# Patient Record
Sex: MALE | Race: BLACK OR AFRICAN AMERICAN | NOT HISPANIC OR LATINO | URBAN - NONMETROPOLITAN AREA
[De-identification: names, ages, dates, MRNs, and addresses within clinical notes are randomized per-mention and may not be internally consistent; named-entity substitution may affect disease eponyms.]

---
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
## 2018-01-26 VITALS
WEIGHT: 249.8 LBS | HEIGHT: 70 IN | SYSTOLIC BLOOD PRESSURE: 120 MMHG | DIASTOLIC BLOOD PRESSURE: 82 MMHG | HEART RATE: 64 BPM | BODY MASS INDEX: 35.76 KG/M2

## 2018-03-05 ENCOUNTER — DOCUMENTATION ONLY (OUTPATIENT)
Dept: FAMILY MEDICINE | Facility: OTHER | Age: 18
End: 2018-03-05

## 2018-03-15 ENCOUNTER — OFFICE VISIT (OUTPATIENT)
Dept: FAMILY MEDICINE | Facility: OTHER | Age: 18
End: 2018-03-15
Attending: FAMILY MEDICINE
Payer: COMMERCIAL

## 2018-03-15 VITALS
DIASTOLIC BLOOD PRESSURE: 76 MMHG | HEART RATE: 76 BPM | SYSTOLIC BLOOD PRESSURE: 132 MMHG | WEIGHT: 258.2 LBS | BODY MASS INDEX: 36.97 KG/M2 | HEIGHT: 70 IN

## 2018-03-15 DIAGNOSIS — M25.562 ACUTE PAIN OF LEFT KNEE: Primary | ICD-10-CM

## 2018-03-15 PROCEDURE — 99213 OFFICE O/P EST LOW 20 MIN: CPT | Performed by: FAMILY MEDICINE

## 2018-03-15 RX ORDER — ETODOLAC 500 MG
500 TABLET ORAL 2 TIMES DAILY
Qty: 14 TABLET | Refills: 1 | Status: SHIPPED | OUTPATIENT
Start: 2018-03-15 | End: 2018-08-30

## 2018-03-15 ASSESSMENT — PAIN SCALES - GENERAL: PAINLEVEL: MILD PAIN (2)

## 2018-03-15 NOTE — MR AVS SNAPSHOT
"              After Visit Summary   3/15/2018    Jose Barksdale    MRN: [id number]           Patient Information     Date Of Birth          2000        Visit Information        Provider Department      3/15/2018 4:15 PM Lamont Rae MD [facility]        Today's Diagnoses     Acute pain of left knee    -  1       Follow-ups after your visit        Who to contact     If you have questions or need follow up information about today's clinic visit or your schedule please contact [facility] directly at [phone].  Normal or non-critical lab and imaging results will be communicated to you by Nyxoahhart, letter or phone within 4 business days after the clinic has received the results. If you do not hear from us within 7 days, please contact the clinic through fitkitt or phone. If you have a critical or abnormal lab result, we will notify you by phone as soon as possible.  Submit refill requests through [employer] or call your pharmacy and they will forward the refill request to us. Please allow 3 business days for your refill to be completed.          Additional Information About Your Visit        MyChart Information     [employer] lets you send messages to your doctor, view your test results, renew your prescriptions, schedule appointments and more. To sign up, go to [email or web address] . Click on \"Log in\" on the left side of the screen, which will take you to the Welcome page. Then click on \"Sign up Now\" on the right side of the page.     You will be asked to enter the access code listed below, as well as some personal information. Please follow the directions to create your username and password.     Your access code is: [email or web address]  Expires: 2018  8:13 AM     Your access code will  in 90 days. If you need help or a new code, please call your [facility] or [phone].        Care EveryWhere ID     This is your Care EveryWhere ID. This could be used by " "other organizations to access your [place] medical records  [id number]        Your Vitals Were     Pulse Height BMI (Body Mass Index)             76 5' 10\" (1.778 m) 37.05 kg/m2          Blood Pressure from Last 3 Encounters:   03/15/18 132/76   08/18/17 120/82    Weight from Last 3 Encounters:   03/15/18 258 lb 3.2 oz (117.1 kg) (>99 %)*   08/18/17 249 lb 12.8 oz (113.3 kg) (>99 %)*     * Growth percentiles are based on [facility] 2-20 Years data.              Today, you had the following     No orders found for display         Today's Medication Changes          These changes are accurate as of 3/15/18 11:59 PM.  If you have any questions, ask your nurse or doctor.               Start taking these medicines.        Dose/Directions    etodolac 500 MG tablet   Commonly known as:  LODINE   Used for:  Acute pain of left knee   Started by:  Lamont Rae MD        Dose:  500 mg   Take 1 tablet (500 mg) by mouth 2 times daily   Quantity:  14 tablet   Refills:  1            Where to get your medicines      These medications were sent to [id number] - [place], [place] - [address]  [address], [facility] [address]     Phone:  [phone]     etodolac 500 MG tablet                Primary Care Provider Fax #    Physician No Ref-Primary [phone]       No address on file        Equal Access to Services     CARLY MARTINEZ AH: Hadii bunny mayero Sodgali, waaxda luqadaha, qaybta kaalmada adeegyada, reed sahu . So [facility] [phone].    ATENCIÓN: Si habla español, tiene a mcdowell disposición servicios gratuitos de asistencia lingüística. Drea al [phone].    We comply with applicable federal civil rights laws and [place] laws. We do not discriminate on the basis of race, color, national origin, age, disability, sex, sexual orientation, or gender identity.            Thank you!     Thank you for choosing [facility] AND [facility]  for " your care. Our goal is always to provide you with excellent care. Hearing back from our patients is one way we can continue to improve our services. Please take a few minutes to complete the written survey that you may receive in the mail after your visit with us. Thank you!             Your Updated Medication List - Protect others around you: Learn how to safely use, store and throw away your medicines at www.disposemymeds.org.          This list is accurate as of 3/15/18 11:59 PM.  Always use your most recent med list.                   Brand Name Dispense Instructions for use Diagnosis    etodolac 500 MG tablet    LODINE    14 tablet    Take 1 tablet (500 mg) by mouth 2 times daily    Acute pain of left knee

## 2018-03-15 NOTE — NURSING NOTE
Patient presents today for left knee pain that started week ago, and right hip flexor pain going on 2 months. Patient plays football, and works out daily.    Yessenia Pradhan LPN on 3/15/2018 at 4:18 PM

## 2018-03-16 NOTE — PROGRESS NOTES
"  SUBJECTIVE:   Jose Barksdale is a 17 year old male who presents to clinic today for the following health issues: Left knee right hip pain    HPI Comments: Patient arrives here for left knee pain and right hip pain.  Especially with movement.  Started about 2 days ago.  He woke up with it with his left knee stiff.  Mainly located on the left lateral and medial side.  Also reports right hip discomfort.  He denies any injury but states when he squats it hurts.  Extends up into the groin area.Patient does play football for [employer] and was recently started increasing his exercise.    Knee Pain           Patient Active Problem List    Diagnosis Date Noted     Acute pain of left knee 03/15/2018     Priority: Medium     Past Medical History:   Diagnosis Date     Personal history of other medical treatment (CODE)     No Comments Provided      Past Surgical History:   Procedure Laterality Date     OTHER SURGICAL HISTORY      [id number],NO PAST SURGERIES     Current Outpatient Prescriptions   Medication Sig Dispense Refill     etodolac (LODINE) 500 MG tablet Take 1 tablet (500 mg) by mouth 2 times daily 14 tablet 1     No Known Allergies    Review of Systems     OBJECTIVE:     /76 (BP Location: Right arm, Patient Position: Sitting, Cuff Size: Adult Large)  Pulse 76  Ht 5' 10\" (1.778 m)  Wt 258 lb 3.2 oz (117.1 kg)  BMI 37.05 kg/m2  Body mass index is 37.05 kg/(m^2).  Physical Exam   Constitutional: He appears well-developed.   Genitourinary:   Genitourinary Comments: No inguinal hernia present.   Musculoskeletal:   Internal/external rotation of the hip is unremarkable.  His left knee ligaments are intact.  There is no joint effusion.   Neurological: He is alert.   Skin: Skin is warm.       none     ASSESSMENT/PLAN:       We will start Lodine.    ICD-10-CM    1. Acute pain of left knee M25.562 etodolac (LODINE) 500 MG tablet     I suspect his right hip is muscular in origin.  Is pointing to the groin where it hurts.  Refrain " from activity.  If symptoms do not resolve after refraining follow-up in clinic.    Lamont Rae MD  [facility] AND [facility]

## 2018-04-05 ENCOUNTER — OFFICE VISIT (OUTPATIENT)
Dept: FAMILY MEDICINE | Facility: OTHER | Age: 18
End: 2018-04-05
Attending: FAMILY MEDICINE
Payer: COMMERCIAL

## 2018-04-05 VITALS
DIASTOLIC BLOOD PRESSURE: 80 MMHG | SYSTOLIC BLOOD PRESSURE: 124 MMHG | HEART RATE: 60 BPM | HEIGHT: 70 IN | BODY MASS INDEX: 37.16 KG/M2 | WEIGHT: 259.6 LBS

## 2018-04-05 DIAGNOSIS — M79.661 PAIN OF RIGHT LOWER LEG: Primary | ICD-10-CM

## 2018-04-05 PROCEDURE — 99213 OFFICE O/P EST LOW 20 MIN: CPT | Performed by: FAMILY MEDICINE

## 2018-04-05 PROCEDURE — G0463 HOSPITAL OUTPT CLINIC VISIT: HCPCS

## 2018-04-05 ASSESSMENT — PAIN SCALES - GENERAL: PAINLEVEL: SEVERE PAIN (7)

## 2018-04-05 NOTE — MR AVS SNAPSHOT
"              After Visit Summary   2018    Jose Barksdale    MRN: [id number]           Patient Information     Date Of Birth          2000        Visit Information        Provider Department      2018 3:00 PM Lamont Rae MD [facility]        Today's Diagnoses     Pain of right lower leg    -  1       Follow-ups after your visit        Additional Services     PHYSICAL THERAPY REFERRAL       *                  Who to contact     If you have questions or need follow up information about today's clinic visit or your schedule please contact [facility] directly at [phone].  Normal or non-critical lab and imaging results will be communicated to you by SUNDAYTOZhart, letter or phone within 4 business days after the clinic has received the results. If you do not hear from us within 7 days, please contact the clinic through [employer] or phone. If you have a critical or abnormal lab result, we will notify you by phone as soon as possible.  Submit refill requests through [employer] or call your pharmacy and they will forward the refill request to us. Please allow 3 business days for your refill to be completed.          Additional Information About Your Visit        MyChart Information     [employer] lets you send messages to your doctor, view your test results, renew your prescriptions, schedule appointments and more. To sign up, go to [email or web address] . Click on \"Log in\" on the left side of the screen, which will take you to the Welcome page. Then click on \"Sign up Now\" on the right side of the page.     You will be asked to enter the access code listed below, as well as some personal information. Please follow the directions to create your username and password.     Your access code is: [email or web address]  Expires: 2018  8:13 AM     Your access code will  in 90 days. If you need help or a new code, please call your [place] clinic or [phone].        Care " "EveryWhere ID     This is your Care EveryWhere ID. This could be used by other organizations to access your [place] medical records  [id number]        Your Vitals Were     Pulse Height BMI (Body Mass Index)             60 5' 10\" (1.778 m) 37.25 kg/m2          Blood Pressure from Last 3 Encounters:   04/05/18 124/80   03/15/18 132/76   08/18/17 120/82    Weight from Last 3 Encounters:   04/05/18 259 lb 9.6 oz (117.8 kg) (>99 %)*   03/15/18 258 lb 3.2 oz (117.1 kg) (>99 %)*   08/18/17 249 lb 12.8 oz (113.3 kg) (>99 %)*     * Growth percentiles are based on [facility] 2-20 Years data.              We Performed the Following     PHYSICAL THERAPY REFERRAL        Primary Care Provider Fax #    Physician No Ref-Primary [phone]       No address on file        Equal Access to Services     CARLY MARTINEZ : Hadii bunny Douglas, waelke bae, qacheryl kaalmada louisa, reed sahu . So [facility] [phone].    ATENCIÓN: Si habla español, tiene a mcdowell disposición servicios gratuitos de asistencia lingüística. Llame al [phone].    We comply with applicable federal civil rights laws and [place] laws. We do not discriminate on the basis of race, color, national origin, age, disability, sex, sexual orientation, or gender identity.            Thank you!     Thank you for choosing [facility] AND [facility]  for your care. Our goal is always to provide you with excellent care. Hearing back from our patients is one way we can continue to improve our services. Please take a few minutes to complete the written survey that you may receive in the mail after your visit with us. Thank you!             Your Updated Medication List - Protect others around you: Learn how to safely use, store and throw away your medicines at www.disposemymeds.org.          This list is accurate as of 4/5/18 11:59 PM.  Always use your most recent med list.                   Brand Name Dispense Instructions for use " Diagnosis    etodolac 500 MG tablet    LODINE    14 tablet    Take 1 tablet (500 mg) by mouth 2 times daily    Acute pain of left knee

## 2018-04-05 NOTE — NURSING NOTE
Patient here for right knee pain and right hip pain. Tried the Lodine but still hurts. He is in football now.Waleska Yi LPN .......................4/5/2018  3:07 PM

## 2018-04-06 PROBLEM — M79.661 PAIN OF RIGHT LOWER LEG: Status: ACTIVE | Noted: 2018-04-06

## 2018-04-06 PROBLEM — M25.562 ACUTE PAIN OF LEFT KNEE: Status: RESOLVED | Noted: 2018-03-15 | Resolved: 2018-04-06

## 2018-04-06 NOTE — PROGRESS NOTES
"  SUBJECTIVE:   Jose Barksdale is a 17 year old male who presents to clinic today for the following health issues: Follow-up to leg pain right buttocks pain    HPI Comments: Patient arrives here for follow-up to right leg pain.  Extending into the hip.  It has been going on 3 weeks.  He was seen a couple weeks ago and started on Lodine without any improvement.  He continues to lift with his upper body.  But states he has not really been exercising his lower body.  He is interested in physical therapy.  Also is a little concerned about knee pain    Pain           Patient Active Problem List    Diagnosis Date Noted     Pain of right lower leg 04/06/2018     Priority: Medium     Past Medical History:   Diagnosis Date     Personal history of other medical treatment (CODE)     No Comments Provided      Past Surgical History:   Procedure Laterality Date     OTHER SURGICAL HISTORY      [id number],NO PAST SURGERIES     Current Outpatient Prescriptions   Medication Sig Dispense Refill     etodolac (LODINE) 500 MG tablet Take 1 tablet (500 mg) by mouth 2 times daily (Patient not taking: Reported on 4/5/2018) 14 tablet 1     No Known Allergies    Review of Systems     OBJECTIVE:     /80 (BP Location: Right arm, Patient Position: Sitting)  Pulse 60  Ht 5' 10\" (1.778 m)  Wt 259 lb 9.6 oz (117.8 kg)  BMI 37.25 kg/m2  Body mass index is 37.25 kg/(m^2).  Physical Exam   Constitutional: He appears well-developed.   Musculoskeletal: Normal range of motion. He exhibits no tenderness.   Knee does not show any joint effusion.  Ligaments are intact period leg shows good range of motion.  Internal/external rotation the hip was normal.  Quite muscular   Neurological: He is alert.       none     ASSESSMENT/PLAN:       I discussed stretching.  Patient states he stretches on a regular basis.  Have patient see physical therapy.  Patient will call if he does not get any appointment in the next week or 2.    ICD-10-CM    1. Pain of right " lower leg M79.661 PHYSICAL THERAPY REFERRAL         Lamont Rae MD  [facility] AND [facility]

## 2018-05-02 ENCOUNTER — HOSPITAL ENCOUNTER (OUTPATIENT)
Dept: GENERAL RADIOLOGY | Facility: OTHER | Age: 18
End: 2018-05-02
Attending: FAMILY MEDICINE
Payer: COMMERCIAL

## 2018-05-02 ENCOUNTER — OFFICE VISIT (OUTPATIENT)
Dept: FAMILY MEDICINE | Facility: OTHER | Age: 18
End: 2018-05-02
Attending: FAMILY MEDICINE
Payer: COMMERCIAL

## 2018-05-02 ENCOUNTER — HOSPITAL ENCOUNTER (OUTPATIENT)
Dept: GENERAL RADIOLOGY | Facility: OTHER | Age: 18
Discharge: HOME | End: 2018-05-02
Attending: FAMILY MEDICINE | Admitting: FAMILY MEDICINE
Payer: COMMERCIAL

## 2018-05-02 VITALS
SYSTOLIC BLOOD PRESSURE: 130 MMHG | HEART RATE: 64 BPM | WEIGHT: 260.2 LBS | DIASTOLIC BLOOD PRESSURE: 64 MMHG | BODY MASS INDEX: 37.33 KG/M2

## 2018-05-02 DIAGNOSIS — G89.29 CHRONIC PAIN OF RIGHT KNEE: Primary | ICD-10-CM

## 2018-05-02 DIAGNOSIS — M25.562 CHRONIC PAIN OF LEFT KNEE: ICD-10-CM

## 2018-05-02 DIAGNOSIS — M25.551 HIP PAIN, RIGHT: ICD-10-CM

## 2018-05-02 DIAGNOSIS — G89.29 CHRONIC PAIN OF LEFT KNEE: ICD-10-CM

## 2018-05-02 DIAGNOSIS — M25.561 CHRONIC PAIN OF RIGHT KNEE: Primary | ICD-10-CM

## 2018-05-02 PROBLEM — M79.661 PAIN OF RIGHT LOWER LEG: Status: RESOLVED | Noted: 2018-04-06 | Resolved: 2018-05-02

## 2018-05-02 PROCEDURE — 73502 X-RAY EXAM HIP UNI 2-3 VIEWS: CPT

## 2018-05-02 PROCEDURE — 99213 OFFICE O/P EST LOW 20 MIN: CPT | Performed by: FAMILY MEDICINE

## 2018-05-02 PROCEDURE — G0463 HOSPITAL OUTPT CLINIC VISIT: HCPCS

## 2018-05-02 PROCEDURE — 73564 X-RAY EXAM KNEE 4 OR MORE: CPT | Mod: 50

## 2018-05-02 PROCEDURE — G0463 HOSPITAL OUTPT CLINIC VISIT: HCPCS | Mod: 25

## 2018-05-02 ASSESSMENT — PAIN SCALES - GENERAL: PAINLEVEL: NO PAIN (0)

## 2018-05-02 NOTE — NURSING NOTE
Patient here for follow up to bilateral knee pain and his hips do not feel right. Right hip pain. He is not taking any medications. Waleska Yi LPN .......................5/2/2018  3:49 PM

## 2018-05-03 NOTE — PROGRESS NOTES
SUBJECTIVE:   Jose Barksdale is a 17 year old male who presents to clinic today for the following health issues: Bilateral knee pain right hip pain    HPI Comments: Patient arrives here for bilateral knee pain and right hip pain.  He reports pain is worse with activity.  Requesting x-rays.  No activity produces no pain.  He has been seen on 2 occasions in the past given anti-inflammatory medications recommendations for stretching and exercises.  He reports no improvement    Knee Pain           Patient Active Problem List    Diagnosis Date Noted     Chronic pain of right knee 05/02/2018     Priority: Medium     Chronic pain of left knee 05/02/2018     Priority: Medium     Hip pain, right 05/02/2018     Priority: Medium     Past Medical History:   Diagnosis Date     Personal history of other medical treatment (CODE)     No Comments Provided      Past Surgical History:   Procedure Laterality Date     OTHER SURGICAL HISTORY      [id number],NO PAST SURGERIES     No Known Allergies    Review of Systems     OBJECTIVE:     /64 (Patient Position: Sitting)  Pulse 64  Wt 260 lb 3.2 oz (118 kg)  BMI 37.33 kg/m2  Body mass index is 37.33 kg/(m^2).  Physical Exam   Constitutional: He appears well-developed.   Neurological: He is alert.   Psychiatric: He has a normal mood and affect.       Diagnostic Test Results:  Results for orders placed or performed in visit on 05/02/18   XR Knee Bilateral G/E 4 Views    Narrative    PROCEDURE:  XR HIP RIGHT 2-3 VIEWS, XR KNEE BILATERAL G/E 4 VW    HISTORY: ; Hip pain, right.    COMPARISON:  None.    TECHNIQUE:  2 views right hip. 5 views of the knees.    FINDINGS:  No fracture or dislocation is identified. No CAM or pincer  abnormality is identified however, a small notch is questioned within  the superior right femoral neck. The joint spaces are preserved. No  foreign body is seen.     The knees appear normal, with preserved joint spaces and no evidence  of suprapatellar effusion.       Impression    IMPRESSION: Question small notch right superior femoral neck.  Correlate for any evidence of impingement.      Negative radiographs of the knees.    MARI JEFFERY MD   XR Hip Right 2-3 Views    Narrative    PROCEDURE:  XR HIP RIGHT 2-3 VIEWS, XR KNEE BILATERAL G/E 4 VW    HISTORY: ; Hip pain, right.    COMPARISON:  None.    TECHNIQUE:  2 views right hip. 5 views of the knees.    FINDINGS:  No fracture or dislocation is identified. No CAM or pincer  abnormality is identified however, a small notch is questioned within  the superior right femoral neck. The joint spaces are preserved. No  foreign body is seen.     The knees appear normal, with preserved joint spaces and no evidence  of suprapatellar effusion.      Impression    IMPRESSION: Question small notch right superior femoral neck.  Correlate for any evidence of impingement.      Negative radiographs of the knees.    MARI JEFFERY MD       ASSESSMENT/PLAN:           ICD-10-CM    1. Chronic pain of right knee M25.561 XR Knee Bilateral G/E 4 Views    G89.29 ORTHOPEDICS ADULT REFERRAL   2. Chronic pain of left knee M25.562 XR Knee Bilateral G/E 4 Views    G89.29 ORTHOPEDICS ADULT REFERRAL   3. Hip pain, right M25.551 XR Hip Right 2-3 Views   Patient has questions about doing an MRI.  He also is wanting a second opinion.  Arrangements made with orthopedic surgery.      Lamont Rae MD  [facility]

## 2018-05-07 ENCOUNTER — OFFICE VISIT (OUTPATIENT)
Dept: ORTHOPEDICS | Facility: OTHER | Age: 18
End: 2018-05-07
Attending: ORTHOPAEDIC SURGERY
Payer: COMMERCIAL

## 2018-05-07 VITALS
HEART RATE: 60 BPM | SYSTOLIC BLOOD PRESSURE: 118 MMHG | HEIGHT: 70 IN | BODY MASS INDEX: 37.22 KG/M2 | DIASTOLIC BLOOD PRESSURE: 80 MMHG | WEIGHT: 260 LBS

## 2018-05-07 DIAGNOSIS — R29.898 HIP TIGHTNESS: Primary | ICD-10-CM

## 2018-05-07 DIAGNOSIS — G89.29 CHRONIC PAIN OF RIGHT KNEE: ICD-10-CM

## 2018-05-07 DIAGNOSIS — M25.561 CHRONIC PAIN OF RIGHT KNEE: ICD-10-CM

## 2018-05-07 DIAGNOSIS — M25.562 CHRONIC PAIN OF LEFT KNEE: ICD-10-CM

## 2018-05-07 DIAGNOSIS — G89.29 CHRONIC PAIN OF LEFT KNEE: ICD-10-CM

## 2018-05-07 PROCEDURE — 99203 OFFICE O/P NEW LOW 30 MIN: CPT | Performed by: ORTHOPAEDIC SURGERY

## 2018-05-07 PROCEDURE — G0463 HOSPITAL OUTPT CLINIC VISIT: HCPCS

## 2018-05-07 ASSESSMENT — PAIN SCALES - GENERAL: PAINLEVEL: MILD PAIN (3)

## 2018-05-07 NOTE — PROGRESS NOTES
Jose Barksdale was seen in consultation for Dr. Rae for a chief complaint of right hip pain as well as soreness into his knees.    CHIEF COMPLAINT: Jose Barksdale is a 17 year old  male  Chief Complaint   Patient presents with     Consult     Right hip pain       HISTORY OF PRESENTING INJURY   History of presenting injury, patient has had ongoing complaints of some generalized hip and leg pain since February.  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  Pain is diffuse and globally around the hip but running from buttock towards the quadriceps and hamstrings towards his knees.  Description of pain: no.  Radiation of pain: Yes.  Pain course: stable.  Worse with: Increased activity.  Improved by: Rest.  History of injection: no.  Any PT: no.      PAST MEDICAL HISTORY:  Past Medical History:   Diagnosis Date     Personal history of other medical treatment (CODE)     No Comments Provided       PAST SURGICAL HISTORY:  Past Surgical History:   Procedure Laterality Date     OTHER SURGICAL HISTORY      [id number],NO PAST SURGERIES       ALLERGIES:  No Known Allergies    CURRENT MEDICATIONS:  Current Outpatient Prescriptions   Medication Sig Dispense Refill     etodolac (LODINE) 500 MG tablet Take 1 tablet (500 mg) by mouth 2 times daily 14 tablet 1       SOCIAL HISTORY:  Marital Status: single (never ).  Children: no.  Occupation: Student.  Alcohol use:none.  Tobacco use: Smoker: no.  Are you or have you used illicit drugs:  no.    FAMILY HISTORY:  Family History   Problem Relation Age of Onset     Hypertension Mother      Hypertension     Type 2 Diabetes Father      Diabetes type II       REVIEW OF SYSTEMS:  The review of systems as documented in the HPI and on the intake questionnaire, completed by the patient on 5/7/2018 have been reviewed by myself and the pertinent positives and negatives addressed.  The  "remainder of the complete review of systems was non-contributory.    PHYSICALEXAM:   /80  Pulse 60  Ht 1.778 m (5' 10\")  Wt 117.9 kg (260 lb)  BMI 37.31 kg/m2 Body mass index is 37.31 kg/(m^2).    General Appearance: Pleasant 17 year old male in good appearance, mood and affect.    Alert and orientated times three ( time, date and location).    Skin: Intact.      Hip:  Palpation: Tenderness along the iliotibial band but also into the quadriceps and hamstring region.  Flexion: normal.  Extension: normal.  Internal rotation: normal.  External rotation: normal.  Special maneuvers: negative pain with push-pull, and/or log roll testing  Strength: Equal bilaterally.    Knee:    Effusion: No effusion.  Motion: Slight tightness into the hamstrings.  Patella tracks midline.  Varus stress testing normal.  Valgus stress testing normal.  Anterior drawer test normal.  Posterior drawer test normal.  Lachman's test normal.  Ban's is normal.  Lorain test is normal.  Apley's grind test negative.    Feet:  Pes planus positive.  Sensation  normal.  Capillary refill normal.    Eyes: Pupils are round.    Ears:  Hearing: Intact.    Heart: Good capillary refill into his hands pulses are regular.    Lungs: Clear.     Radiographic images from 5/2 where independently reviewed and discussed with the patient.      Xray:    Small cystic area old in nature right femoral neck.  No signs of impingement or cam lesions.  X-rays of the knee show slight varus malalignment but maintained joint spaces.    PROCEDURE: XR HIP RIGHT 2-3 VIEWS, XR KNEE BILATERAL G/E 4 VW    HISTORY: ; Hip pain, right.    COMPARISON: None.    TECHNIQUE: 2 views right hip. 5 views of the knees.    FINDINGS: No fracture or dislocation is identified. No CAM or pincer  abnormality is identified however, a small notch is questioned within  the superior right femoral neck. The joint spaces are preserved. No  foreign body is seen.     The knees appear normal, with " preserved joint spaces and no evidence  of suprapatellar effusion.    IMPRESSION: Question small notch right superior femoral neck.  Correlate for any evidence of impingement.     Negative radiographs of the knees.    MARI JEFFERY MD    IMPRESSION:    Imbalance of the flexor and extensors of the right hip.    PLAN:  Risks, benefits, conservative, surgical and alternatives to treatment where discussed and the patient would like to proceed with conservative measures.  I will have him see the physical therapist for a good stretching program and to assess hip and knee and see if there is anything else needs to be working on.  Questions and concerns answered to his satisfaction.  He will continue to work on his stretches prior to doing any weight lifting.  No surgical intervention needed can follow-up with his primary care physician.    Luis Milian D.O.  Orthopaedic Surgeon    [facility] and [address] [place] [address]  Phone [phone] (KNEE)  Fax [phone]    Disclaimer:  This note consists of words and symbols derived from keyboarding, dictation, or using voice recognition software. As a result, there may be errors in the script that have gone undetected. Please consider this when interpreting information found in this note.    4:17 PM 5/7/2018

## 2018-05-07 NOTE — MR AVS SNAPSHOT
"              After Visit Summary   5/7/2018    Jose Barksdale    MRN: [id number]           Patient Information     Date Of Birth          2000        Visit Information        Provider Department      5/7/2018 3:45 PM Luis Milian,  [facility]        Today's Diagnoses     Hip tightness    -  1    Chronic pain of left knee        Chronic pain of right knee           Follow-ups after your visit        Additional Services     PHYSICAL THERAPY REFERRAL       To see and assess and set up a good home program for his hip and knee stretches in regards to playing football at [employer]                  Follow-up notes from your care team     Return if symptoms worsen or fail to improve.      Who to contact     If you have questions or need follow up information about today's clinic visit or your schedule please contact [facility] directly at [phone].  Normal or non-critical lab and imaging results will be communicated to you by Internet Mallhart, letter or phone within 4 business days after the clinic has received the results. If you do not hear from us within 7 days, please contact the clinic through Internet Mallhart or phone. If you have a critical or abnormal lab result, we will notify you by phone as soon as possible.  Submit refill requests through Oohly or call your pharmacy and they will forward the refill request to us. Please allow 3 business days for your refill to be completed.          Additional Information About Your Visit        MyChart Information     Oohly lets you send messages to your doctor, view your test results, renew your prescriptions, schedule appointments and more. To sign up, go to [email or web address] . Click on \"Log in\" on the left side of the screen, which will take you to the Welcome page. Then click on \"Sign up Now\" on the right side of the page.     You will be asked to enter the access code listed below, as well as some personal information. Please " "follow the directions to create your username and password.     Your access code is: [email or web address]  Expires: 2018  8:13 AM     Your access code will  in 90 days. If you need help or a new code, please call your [place] clinic or [phone].        Care EveryWhere ID     This is your Care EveryWhere ID. This could be used by other organizations to access your [place] medical records  [id number]        Your Vitals Were     Pulse Height BMI (Body Mass Index)             60 1.778 m (5' 10\") 37.31 kg/m2          Blood Pressure from Last 3 Encounters:   18 118/80   18 130/64   18 124/80    Weight from Last 3 Encounters:   18 117.9 kg (260 lb) (>99 %)*   18 118 kg (260 lb 3.2 oz) (>99 %)*   18 117.8 kg (259 lb 9.6 oz) (>99 %)*     * Growth percentiles are based on [facility] 2-20 Years data.              We Performed the Following     PHYSICAL THERAPY REFERRAL        Primary Care Provider Fax #    Physician No Ref-Primary [phone]       No address on file        Equal Access to Services     CARLY MARTINEZ : Daniel Douglas, nigel bae, qacheryl kaaljuly jasso, reed huffman. So [facility] [phone].    ATENCIÓN: Si habla español, tiene a mcdowell disposición servicios gratuitos de asistencia lingüística. Llame al [phone].    We comply with applicable federal civil rights laws and [place] laws. We do not discriminate on the basis of race, color, national origin, age, disability, sex, sexual orientation, or gender identity.            Thank you!     Thank you for choosing [facility] AND [facility]  for your care. Our goal is always to provide you with excellent care. Hearing back from our patients is one way we can continue to improve our services. Please take a few minutes to complete the written survey that you may receive in the mail after your visit with us. Thank you!             Your Updated Medication List - Protect " others around you: Learn how to safely use, store and throw away your medicines at www.disposemymeds.org.          This list is accurate as of 5/7/18  4:23 PM.  Always use your most recent med list.                   Brand Name Dispense Instructions for use Diagnosis    etodolac 500 MG tablet    LODINE    14 tablet    Take 1 tablet (500 mg) by mouth 2 times daily    Acute pain of left knee

## 2018-05-17 ENCOUNTER — HOSPITAL ENCOUNTER (OUTPATIENT)
Dept: PHYSICAL THERAPY | Facility: OTHER | Age: 18
Setting detail: THERAPIES SERIES
End: 2018-05-17
Attending: ORTHOPAEDIC SURGERY
Payer: COMMERCIAL

## 2018-05-17 PROCEDURE — 40000185 ZZHC STATISTIC PT OUTPT VISIT: Performed by: PHYSICAL THERAPIST

## 2018-05-17 PROCEDURE — 97110 THERAPEUTIC EXERCISES: CPT | Mod: GP | Performed by: PHYSICAL THERAPIST

## 2018-05-17 PROCEDURE — 97140 MANUAL THERAPY 1/> REGIONS: CPT | Mod: GP | Performed by: PHYSICAL THERAPIST

## 2018-05-17 PROCEDURE — 97161 PT EVAL LOW COMPLEX 20 MIN: CPT | Mod: GP | Performed by: PHYSICAL THERAPIST

## 2018-05-17 NOTE — PROGRESS NOTES
05/17/18 1329   General Information   Type of Visit Initial OP Ortho PT Evaluation   Start of Care Date 05/17/18   Referring Physician Maicol   Patient/Family Goals Statement Play football this fall for [employer]   Orders Evaluate and Treat   Date of Order 05/07/18   Insurance Type (commercial)   Medical Diagnosis Hip tightness R29.898   Surgical/Medical history reviewed Yes  (reviewed Dr Milian's note)   Precautions/Limitations no known precautions/limitations   Weight-Bearing Status - LLE full weight-bearing   Weight-Bearing Status - RLE full weight-bearing   Special Instructions set up on home stretching program   General Information Comments please refer to pt's medical record for any additional information   Presentation and Etiology   Pertinent history of current problem (include personal factors and/or comorbidities that impact the POC) In February pt was noticing some him discomfort and tightness, R>L, during his workouts. This would sometimes cause some discomfort in his knees that he noticed mostly with running. His symptoms would come and go. I spoke with him on one occasion in the training room about his symptoms. He went to the dr to confirm that there was nothing significantly wrong. Overall he thinks that he is gradually getting better.    Impairments (pain that he relates to a muscle strain)   Functional Limitations perform desired leisure / sports activities   Symptom Location R hip   How/Where did it occur During recreation/sports   Onset date of current episode/exacerbation 02/17/18   Chronicity Recurrent   Pain rating (0-10 point scale) Best (/10);Worst (/10)   Best (/10) 0   Worst (/10) 8   Pain quality G. Cramping;C. Aching   Frequency of pain/symptoms C. With activity   Pain/symptoms exacerbated by D. Carrying  (running)   Pain/symptoms eased by D. Nothing   Progression of symptoms since onset: Improved   Current / Previous Interventions   Diagnostic Tests: X-ray   X-ray Results (see pt's  chart for details)   Prior Level of Function   Prior Level of Function-Mobility Ind   Prior Level of Function-ADLs Ind   Current Level of Function   Current Community Support Other   Patient role/employment history B. Student  ([employer])   Living environment Apartment/condo   Fall Risk Screen   Fall screen completed by PT   Have you fallen 2 or more times in the past year? No   Have you fallen and had an injury in the past year? No   Is patient a fall risk? No   Functional Scales   Functional Scales Other   Other Scales  PSFS   Planned Therapy Interventions   Planned Therapy Interventions joint mobilization;manual therapy;motor coordination training;neuromuscular re-education;stretching;strengthening;ROM   Planned Modality Interventions   Planned Modality Interventions Cryotherapy;Electrical stimulation;Ultrasound   Clinical Impression   Criteria for Skilled Therapeutic Interventions Met yes, treatment indicated   PT Diagnosis R hip pain   Clinical Presentation Stable/Uncomplicated   Clinical Decision Making (Complexity) Low complexity   Therapy Frequency 1 time/week   Predicted Duration of Therapy Intervention (days/wks) 8 weeks   Risk & Benefits of therapy have been explained Yes   Patient, Family & other staff in agreement with plan of care Yes   Education Assessment   Preferred Learning Style Pictures/video;Demonstration   Barriers to Learning No barriers   ORTHO GOALS   PT Ortho Eval Goals 2;1   Ortho Goal 1   Goal Identifier Pain   Goal Description Pt to report a max pain level of 3/10 for improved working out and other football related activities   Target Date 07/12/18   Ortho Goal 2   Goal Identifier Working out/FB related activities   Goal Description Pt to work out without any increased R hip pain   Target Date 07/12/18   Total Evaluation Time   Total Evaluation Time 20

## 2018-07-18 NOTE — PROGRESS NOTES
Outpatient Physical Therapy Discharge Note     Patient: Jose Barksdale  : 2000    Beginning/End Dates:  18    Referring Provider: Dr Milian    Therapy Diagnosis: Hip tightness R29.898    Plan:  Discharge from therapy.    Discharge:   Pt no-showed or canceled their most recent PT appointments and did not rescheduled. Please refer to pt's chart for any additional information. This is an unplanned DC    Reason for Discharge: Patient has failed to schedule further appointments.    Discharge Plan: Patient to continue home program.

## 2018-07-18 NOTE — ADDENDUM NOTE
Encounter addended by: Samson Lewis, PT on: 7/18/2018  7:57 AM<BR>     Actions taken: Sign clinical note, Episode resolved

## 2018-08-30 ENCOUNTER — OFFICE VISIT (OUTPATIENT)
Dept: FAMILY MEDICINE | Facility: OTHER | Age: 18
End: 2018-08-30
Attending: NURSE PRACTITIONER
Payer: COMMERCIAL

## 2018-08-30 VITALS
WEIGHT: 259.8 LBS | BODY MASS INDEX: 37.28 KG/M2 | SYSTOLIC BLOOD PRESSURE: 122 MMHG | HEART RATE: 76 BPM | DIASTOLIC BLOOD PRESSURE: 70 MMHG

## 2018-08-30 DIAGNOSIS — Z00.00 ROUTINE GENERAL MEDICAL EXAMINATION AT A HEALTH CARE FACILITY: Primary | ICD-10-CM

## 2018-08-30 PROCEDURE — 99173 VISUAL ACUITY SCREEN: CPT | Performed by: NURSE PRACTITIONER

## 2018-08-30 PROCEDURE — 99395 PREV VISIT EST AGE 18-39: CPT | Performed by: NURSE PRACTITIONER

## 2018-08-30 PROCEDURE — 92551 PURE TONE HEARING TEST AIR: CPT | Performed by: NURSE PRACTITIONER

## 2018-08-30 ASSESSMENT — PAIN SCALES - GENERAL: PAINLEVEL: NO PAIN (0)

## 2018-08-30 ASSESSMENT — ANXIETY QUESTIONNAIRES
GAD7 TOTAL SCORE: 0
2. NOT BEING ABLE TO STOP OR CONTROL WORRYING: NOT AT ALL
3. WORRYING TOO MUCH ABOUT DIFFERENT THINGS: NOT AT ALL
6. BECOMING EASILY ANNOYED OR IRRITABLE: NOT AT ALL
7. FEELING AFRAID AS IF SOMETHING AWFUL MIGHT HAPPEN: NOT AT ALL
1. FEELING NERVOUS, ANXIOUS, OR ON EDGE: NOT AT ALL
IF YOU CHECKED OFF ANY PROBLEMS ON THIS QUESTIONNAIRE, HOW DIFFICULT HAVE THESE PROBLEMS MADE IT FOR YOU TO DO YOUR WORK, TAKE CARE OF THINGS AT HOME, OR GET ALONG WITH OTHER PEOPLE: NOT DIFFICULT AT ALL
5. BEING SO RESTLESS THAT IT IS HARD TO SIT STILL: NOT AT ALL
4. TROUBLE RELAXING: NOT AT ALL

## 2018-08-30 NOTE — MR AVS SNAPSHOT
After Visit Summary   8/30/2018    Jose Barksdale    MRN: [id number]           Patient Information     Date Of Birth          2000        Visit Information        Provider Department      8/30/2018 2:15 PM Verito Chavez CNP [facility] and [facility]        Today's Diagnoses     Routine general medical examination at a health care facility    -  1      Care Instructions      Preventive Health Recommendations  Male Ages 18 - 20     Yearly exam:             See your health care provider every year in order to  o   Review health changes.   o   Discuss preventive care.    o   Review your medicines if your doctor has prescribed any.    You should be tested each year for STDs (sexually transmitted diseases).     Talk to your provider about cholesterol testing.      If you are at risk for diabetes, you should have a diabetes test (fasting glucose).    Shots: Get a flu shot each year. Get a tetanus shot every 10 years.     Nutrition:    Eat at least 5 servings of fruits and vegetables daily.     Eat whole-grain bread, whole-wheat pasta and brown rice instead of white grains and rice.     Get adequate calcium and Vitamin D.     Lifestyle    Exercise for at least 150 minutes a week (30 minutes a day, 5 days a week). This will help you control your weight and prevent disease.     No smoking.     Wear sunscreen to prevent skin cancer.     See your dentist every six months for an exam and cleaning.             Follow-ups after your visit        Who to contact     If you have questions or need follow up information about today's clinic visit or your schedule please contact [facility] directly at [phone].  Normal or non-critical lab and imaging results will be communicated to you by MyChart, letter or phone within 4 business days after the clinic has received the results. If you do not hear from us within 7 days, please contact the clinic through MyChart or phone. If you have a  "critical or abnormal lab result, we will notify you by phone as soon as possible.  Submit refill requests through WorldMate or call your pharmacy and they will forward the refill request to us. Please allow 3 business days for your refill to be completed.          Additional Information About Your Visit        MyChart Information     WorldMate lets you send messages to your doctor, view your test results, renew your prescriptions, schedule appointments and more. To sign up, go to [email or web address] . Click on \"Log in\" on the left side of the screen, which will take you to the Welcome page. Then click on \"Sign up Now\" on the right side of the page.     You will be asked to enter the access code listed below, as well as some personal information. Please follow the directions to create your username and password.     Your access code is: [email or web address]  Expires: 2018  1:50 PM     Your access code will  in 90 days. If you need help or a new code, please call your [place] clinic or [phone].        Care EveryWhere ID     This is your Care EveryWhere ID. This could be used by other organizations to access your [place] medical records  [id number]        Your Vitals Were     Pulse BMI (Body Mass Index)                76 37.28 kg/m2           Blood Pressure from Last 3 Encounters:   18 122/70   18 118/80   18 130/64    Weight from Last 3 Encounters:   18 259 lb 12.8 oz (117.8 kg) (>99 %)*   18 260 lb (117.9 kg) (>99 %)*   18 260 lb 3.2 oz (118 kg) (>99 %)*     * Growth percentiles are based on CDC 2-20 Years data.              Today, you had the following     No orders found for display       Primary Care Provider Fax #    Physician No Ref-Primary [phone]       No address on file        Equal Access to Services     CARLY MARTINEZ : Daniel Douglas, nigel bae, reed medrano . So [facility] " [phone].    ATENCIÓN: Si habla augustin, tiene a mcdowell disposición servicios gratuitos de asistencia lingüística. Lljanelle al [phone].    We comply with applicable federal civil rights laws and [place] laws. We do not discriminate on the basis of race, color, national origin, age, disability, sex, sexual orientation, or gender identity.            Thank you!     Thank you for choosing [facility] AND [facility]  for your care. Our goal is always to provide you with excellent care. Hearing back from our patients is one way we can continue to improve our services. Please take a few minutes to complete the written survey that you may receive in the mail after your visit with us. Thank you!             Your Updated Medication List - Protect others around you: Learn how to safely use, store and throw away your medicines at www.disposemymeds.org.      Notice  As of 8/30/2018  3:07 PM    You have not been prescribed any medications.

## 2018-08-30 NOTE — NURSING NOTE
Patient presents to the clinic for a sports physical.    Arm span: 74in      Bridger Acharya ..............8/30/2018 2:19 PM

## 2018-08-30 NOTE — PROGRESS NOTES
SUBJECTIVE:   CC: Jose Barksdale is an 17 year old male who presents for preventative health visit.       He feels healthy and does not have any concerns today. He was seen in May 2018 for some right hip pain and left knee pain. This has improved. 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 During the football season he tries to follow a healthy diet and cuts out greasy foods.     VISION   No corrective lenses  Tool used: Case   Right eye:        10/16 (20/32)   Left eye:          10/16 (20/32)   Visual Acuity: Pass  H Plus Lens Screening: Pass  Color vision screening: Pass       HEARING FREQUENCY    Right Ear:      1000 Hz RESPONSE- on Level:   20 db  (Conditioning sound)   1000 Hz: RESPONSE- on Level:   20 db    2000 Hz: RESPONSE- on Level:   20 db    4000 Hz: RESPONSE- on Level:   20 db     Left Ear:      4000 Hz: RESPONSE- on Level:   20 db    2000 Hz: RESPONSE- on Level:   20 db    1000 Hz: RESPONSE- on Level:   20 db     500 Hz: RESPONSE- on Level:   20 db     Right Ear:    500 Hz: RESPONSE- on Level:   20 db     Hearing Acuity: Pass    Hearing Assessment: normal      SPORTS QUESTIONNAIRE:  ======================   School: [employer]                                          Sports: Football    1. no - Has a doctor ever denied or restricted your participation in sports for any reason or told you to give up sports?  2. no - Do you have an ongoing medical condition (like diabetes,asthma, anemia, infections)?   3. no - Are you currently taking any prescription or nonprescription (over-the-counter) medicines or pills?    4. no - Do you have allergies to medicines, pollens, foods or stinging insects?    5. no - Have you ever spent the night in a hospital?  6. no - Have you ever had surgery?   7. no - Have you ever passed out or nearly passed out DURING exercise?  8. no - Have you ever passed out or nearly passed out AFTER exercise?  9. no -Have you ever had discomfort, pain, tightness, or  pressure in your chest during exercise?  10. no -Does your heart race or skip beats (irregular beats) during exercise?   11. no -Has a doctor ever told you that you have ;high blood pressure, a heart murmur, high cholesterol,a heart infection, Rheumatic fever, Kawasaki's Disease?  12. no - Has a doctor ever ordered a test for your heart? (example, ECG/EKG, Echocardiogram, stress test)  13. no -Do you ever get lightheaded or feel more short of breath than expected during exercise?   14. no-Have you ever had an unexplained seizure?   15. no - Do you get more tired or short of breath more quickly than your friends during exercise?   16. no - Has any family member or relative  of heart problems or had an unexpected or unexplained sudden death before age 50 (including unexplained drowning, unexplained car accident or sudden infant death syndrome)?  17. no - Does anyone in your family have hypertrophic cardiomyopathy, Marfan Syndrome, arrhythmogenic right ventricular cardiomyopathy, long QT syndrome, short QT syndrome, Brugada syndrome, or catecholaminergic polymorphic ventricular tachycardia?    18. no - Does anyone in your family have a heart problem, pacemaker, or implanted defibrillator?   19. no -Has anyone in your family had unexplained fainting, unexplained seizures, or near drowning?   20. YES - Have you ever had an injury, like a sprain, muscle or ligament tear or tendonitis, that caused you to miss a practice or game?  Right ankle injury-stress fracture in 6th grade  21. YES - Have you had any broken or fractured bones, or dislocated joints? Right ankle injury-stress fracture in 6th grade  22. YES - Have you had an injury that required x-rays, MRI, CT, surgery, injections, therapy, a brace, a cast, or crutches? Right ankle injury-stress fracture in 6th grade  23. YES - Have you ever had a stress fracture?  Right ankle injury-stress fracture in 6th grade  24. no - Have you ever been told that you have or have  you had an x-ray for neck instability or atlantoaxial instability? (Down syndrome or dwarfism)  25. no - Do you regularly use a brace, orthotics or assistive device?    26. no -Do you have a bone,muscle, or joint injury that bothers you?   27. no- Do any of your joints become painful, swollen, feel warm or look red?   28. no -Do you have any history of juvenile arthritis or connective tissue disease?   29. no - Has a doctor ever told you that you have asthma or allergies?   30. no - Do you cough, wheeze, have chest tightness, or have difficulty breathing during or after exercise?    31. no - Is there anyone in your family who has asthma?    32. no - Have you ever used an inhaler or taken asthma medicine?   33. no - Do you develop a rash or hives when you exercise?   34. no - Were you born without or are you missing a kidney, an eye, a testicle (males), or any other organ?  35. no- Do you have groin pain or a painful bulge or hernia in the groin area?   36. no - Have you had infectious mononucleosis (mono) within the last month?   37. no - Do you have any rashes, pressure sores, or other skin problems?   38. no - Have you had a herpes or MRSA skin infection?    39. no - Have you ever had a head injury or concussion?   40. no - Have you ever had a hit or blow in the head that caused confusion, prolonged headaches, or memory problems?    41. no - Do you have a history of seizure disorder?    42. no - Do you have headaches with exercise?   43. no - Have you ever had numbness, tingling or weakness in your arms or legs after being hit or falling?   44. no - Have you ever been unable to move your arms or legs after being hit or falling?   45. no -Have you ever become ill while exercising in the heat?  46. no -Do you get frequent muscle cramps when exercising?  47. no - Do you or someone in your family have sickle cell trait or disease?    48. YES - Have you had any problems with your eyes or vision?  Has noticed blurred  vision- mostly at night for a while. Plans on seeing eye doctor.  49. no - Have you had any eye injuries?   50. no - Do you wear glasses or contact lenses?    51. no - Do you wear protective eyewear, such as goggles or a face shield?  52. no- Do you worry about your weight?    53. no - Are you trying to or has anyone recommended that you gain or lose weight?    54. no- Are you on a special diet or do you avoid certain types of foods?  55. no- Have you ever had an eating disorder?   56. no - Do you have any concerns that you would like to discuss with a doctor?        Today's PHQ-2 Score:   PHQ-2 ( 1999 Pf[employer]) 3/15/2018   Q1: Little interest or pleasure in doing things 0   Q2: Feeling down, depressed or hopeless 0   PHQ-2 Score 0       Abuse: Current or Past(Physical, Sexual or Emotional)- No  Do you feel safe in your environment - Yes    Social History   Substance Use Topics     Smoking status: Never Smoker     Smokeless tobacco: Never Used     Alcohol use No      If you drink alcohol do you typically have >3 drinks per day or >7 drinks per week? No                      Last PSA: No results found for: PSA    Reviewed orders with patient. Reviewed health maintenance and updated orders accordingly - Yes  Labs reviewed in EPIC  BP Readings from Last 3 Encounters:   08/30/18 122/70   05/07/18 118/80   05/02/18 130/64    Wt Readings from Last 3 Encounters:   08/30/18 259 lb 12.8 oz (117.8 kg) (>99 %)*   05/07/18 260 lb (117.9 kg) (>99 %)*   05/02/18 260 lb 3.2 oz (118 kg) (>99 %)*     * Growth percentiles are based on CDC 2-20 Years data.                  Patient Active Problem List   Diagnosis     Chronic pain of right knee     Chronic pain of left knee     Hip pain, right     Past Surgical History:   Procedure Laterality Date     OTHER SURGICAL HISTORY      [id number],NO PAST SURGERIES       Social History   Substance Use Topics     Smoking status: Never Smoker     Smokeless tobacco: Never Used     Alcohol use No      Family History   Problem Relation Age of Onset     Hypertension Mother      Hypertension     Type 2 Diabetes Father      Diabetes type II         No current outpatient prescriptions on file.     No Known Allergies    Reviewed and updated as needed this visit by clinical staff         Reviewed and updated as needed this visit by Provider          ROS:  CONSTITUTIONAL: NEGATIVE for fever, chills, change in weight  INTEGUMENTARY/SKIN: NEGATIVE for worrisome rashes, moles or lesions  EYES: Has noticed for a while blurred vision- worse at night  ENT: NEGATIVE for ear, mouth and throat problems  RESP: NEGATIVE for significant cough or SOB  CV: NEGATIVE for chest pain, palpitations or peripheral edema  GI: NEGATIVE for nausea, abdominal pain, heartburn, or change in bowel habits   male: negative for dysuria, hematuria  MUSCULOSKELETAL: NEGATIVE for significant arthralgias or myalgia  NEURO: NEGATIVE for weakness, dizziness or paresthesias  PSYCHIATRIC: NEGATIVE for changes in mood or affect    OBJECTIVE:     /70 (BP Location: Right arm, Patient Position: Sitting, Cuff Size: Adult Large)  Pulse 76  Wt 259 lb 12.8 oz (117.8 kg)  BMI 37.28 kg/m2    EXAM:    GENERAL: healthy, alert and no distress  EYES: Eyes grossly normal to inspection, PERRL and conjunctivae and sclerae normal  HENT: ear canals and TM's normal, nose and mouth without ulcers or lesions  NECK: no adenopathy, no asymmetry, masses, or scars and thyroid normal to palpation  RESP: lungs clear to auscultation - no rales, rhonchi or wheezes  CV: regular rate and rhythm, normal S1 S2, no S3 or S4, no murmur, click or rub, no peripheral edema and peripheral pulses strong. No murmur auscultated with valsalva.  ABDOMEN: soft, nontender, no hepatosplenomegaly, no masses and bowel sounds normal  MS: no gross musculoskeletal defects noted, no edema  SKIN: no suspicious lesions or rashes  NEURO: Normal strength and tone, mentation intact and speech  "normal  PSYCH: mentation appears normal, affect normal/bright  SPORTS EXAM:    No Marfan stigmata: kyphoscoliosis, high-arched palate, pectus excavatuM, arachnodactyly, arm span > height, hyperlaxity, myopia, MVP, aortic insufficieny)  Eyes: normal fundoscopic and pupils  Cardiovascular: normal PMI, simultaneous femoral/radial pulses, no murmurs (standing, supine, Valsalva)  Skin: no HSV, MRSA, tinea corporis  Musculoskeletal    Neck: normal    Back: normal    Shoulder/arm: normal    Elbow/forearm: normal    Wrist/hand/fingers: normal    Hip/thigh: normal    Knee: normal    Leg/ankle: normal    Foot/toes: normal    Functional (Single Leg Hop or Squat): normal Duck walk: normal        Diagnostic Test Results:  none     ASSESSMENT/PLAN:   1. Routine general medical examination at a health care facility  Normal Exam  - Immunization records not available but he believes he is up to date on his immunizations.  - Sports safety discussed        COUNSELING:  Reviewed preventive health counseling, as reflected in patient instructions    BP Readings from Last 1 Encounters:   05/07/18 118/80     Estimated body mass index is 37.31 kg/(m^2) as calculated from the following:    Height as of 5/7/18: 5' 10\" (1.778 m).    Weight as of 5/7/18: 260 lb (117.9 kg).         reports that he has never smoked. He has never used smokeless tobacco.      Counseling Resources:  ATP IV Guidelines  Pooled Cohorts Equation Calculator  FRAX Risk Assessment  ICSI Preventive Guidelines  Dietary Guidelines for Americans, 2010  USDA's MyPlate  ASA Prophylaxis  Lung CA Screening    Verito Chavez, CNP  [facility]  "

## 2018-08-31 ASSESSMENT — PATIENT HEALTH QUESTIONNAIRE - PHQ9: SUM OF ALL RESPONSES TO PHQ QUESTIONS 1-9: 0

## 2018-08-31 ASSESSMENT — ANXIETY QUESTIONNAIRES: GAD7 TOTAL SCORE: 0

## 2019-08-14 ENCOUNTER — TELEPHONE (OUTPATIENT)
Dept: PEDIATRICS | Age: 19
End: 2019-08-14